# Patient Record
Sex: MALE | Race: WHITE
[De-identification: names, ages, dates, MRNs, and addresses within clinical notes are randomized per-mention and may not be internally consistent; named-entity substitution may affect disease eponyms.]

---
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
## 2022-07-13 ENCOUNTER — NON-APPOINTMENT (OUTPATIENT)
Age: 22
End: 2022-07-13

## 2022-09-28 ENCOUNTER — NON-APPOINTMENT (OUTPATIENT)
Age: 22
End: 2022-09-28

## 2023-05-15 ENCOUNTER — NON-APPOINTMENT (OUTPATIENT)
Age: 23
End: 2023-05-15

## 2023-09-04 ENCOUNTER — NON-APPOINTMENT (OUTPATIENT)
Age: 23
End: 2023-09-04

## 2024-12-13 ENCOUNTER — EMERGENCY (EMERGENCY)
Facility: HOSPITAL | Age: 24
LOS: 0 days | Discharge: HOME | End: 2024-12-13
Attending: EMERGENCY MEDICINE
Payer: COMMERCIAL

## 2024-12-13 VITALS
RESPIRATION RATE: 18 BRPM | WEIGHT: 130.07 LBS | TEMPERATURE: 98 F | HEART RATE: 79 BPM | DIASTOLIC BLOOD PRESSURE: 96 MMHG | SYSTOLIC BLOOD PRESSURE: 147 MMHG | OXYGEN SATURATION: 99 %

## 2024-12-13 PROCEDURE — 99282 EMERGENCY DEPT VISIT SF MDM: CPT

## 2024-12-13 PROCEDURE — 99283 EMERGENCY DEPT VISIT LOW MDM: CPT

## 2024-12-13 NOTE — ED PROVIDER NOTE - OBJECTIVE STATEMENT
Patient is a 24-year-old uncircumcised male with no significant past medical history presenting for medical evaluation.  Patient states he showered earlier this evening and afterwards noticed he had more foreskin over the glans of his penis than normal.  Patient states he became concerned and was researching causes online and thought he might have phimosis.  Patient denies penile pain, rashes, discharge, testicular pain, urinary symptoms, fevers, history of STIs, recent trauma, or additional concerns.

## 2024-12-13 NOTE — ED PROVIDER NOTE - PATIENT PORTAL LINK FT
You can access the FollowMyHealth Patient Portal offered by [facility] by registering at the following website: [email or web address]. By joining [facility]’s FollowMyHealth portal, you will also be able to view your health information using other applications (apps) compatible with our system.

## 2024-12-13 NOTE — ED PROVIDER NOTE - NSFOLLOWUPINSTRUCTIONS_ED_ALL_ED_FT
Foreskin Hygiene, Adult  An uncircumcised penis and a circumcised penis.  The foreskin is the loose skin that covers the head of the penis (glans). Keeping the foreskin area clean can help prevent infection and other conditions. If this area is not cleaned, a creamy substance called smegma can collect under the foreskin and cause odor and irritation.    Supplies needed:  Mild soap.  Water.  How to keep the foreskin area clean  Wash the area under the foreskin once a day in the shower or bathtub.  Gently pull back (retract) the foreskin to uncover the glans. Do not retract the foreskin farther back than is comfortable. The distance that the foreskin can retract is different for each person.  Wash the glans with mild soap and water. Rinse the area thoroughly.  Dry the glans when you get out of the shower or bathtub.  Slide the foreskin back to its regular position.  When peeing (urinating), always retract a bit of foreskin to keep the glans clean.    Contact a health care provider if:  You have trouble doing any of the cleaning steps.  You cannot pee or you have pain when you pee.  You have pain in your penis.  The skin of your penis is swollen or irritated.  Your foreskin becomes red, swollen, or painful.  Your penis develops an odor that does not go away with regular cleaning.  Get help right away if:  You cannot slide your foreskin back over the glans after you retract it.  This information is not intended to replace advice given to you by your health care provider. Make sure you discuss any questions you have with your health care provider.

## 2024-12-13 NOTE — ED ADULT TRIAGE NOTE - CHIEF COMPLAINT QUOTE
Patient presents with c/o of inability of get an erection.  Reports he is uncircumcised and was cleaning himself pulling back the skin, since this time he is unable to get erect.  Denies pain, swelling to the area.

## 2024-12-13 NOTE — ED PROVIDER NOTE - ATTENDING CONTRIBUTION TO CARE
24-year-old male with no past medical history, uncircumcised, presents as he states after retracting his foreskin earlier he noted more of a band of skin over his penis than usual, and researched online and was concerned he might have phimosis. States foreskin is fully retractile. Has no issues urinating. Denies all other symptoms including penile pain, discharge, groin rash, fever, vomiting, abdominal pain. On exam, afebrile, hemodynamically stable, saturating well on room air, NAD, well appearing, sitting comfortably in bed, no WOB, speaking full sentences, head NCAT, EOMI grossly, MMM, breathing comfortably on room air, AAO, CN's 3-12 grossly intact, ABREU spontaneously, skin warm, well perfused, no rashes or hives,  (Dr. Castelan as chaperone): Normal external genitalia, uncircumcised, no swelling or discoloration, no excess skin noted, fully retractable and reducible foreskin, nontender penile shaft. No evidence of external abnormality. No evidence of phimosis. No evidence of fracture. Urinating normally. Patient is well appearing, NAD, afebrile, hemodynamically stable. Discharged with instructions in further symptomatic care, return precautions.

## 2024-12-13 NOTE — ED ADULT NURSE NOTE - OBJECTIVE STATEMENT
Pt c/o inability to have an erection. Pt states he was cleaning his foreskin and since then he is unable to get erect. Pt is uncircumcised. Pt denies pain/swelling

## 2024-12-13 NOTE — ED ADULT NURSE NOTE - BEFAST BALANCE
No
Rivaroxaban/Xarelto - Compliance/Rivaroxaban/Xarelto - Dietary Advice/Rivaroxaban/Xarelto - Follow up monitoring/Rivaroxaban/Xarelto - Potential for adverse drug reactions and interactions

## 2024-12-13 NOTE — ED PROVIDER NOTE - PHYSICAL EXAMINATION
VITAL SIGNS: I have reviewed nursing notes and confirm.  CONSTITUTIONAL: Well-appearing, non-toxic, in NAD  SKIN: Warm dry, normal skin turgor  HEAD: NCAT  EYES: No conjunctival injection, scleral anicteric  ENT: Moist mucous membranes, normal pharynx with no erythema or exudates  NECK: Supple; full ROM. Nontender. No cervical LAD  CARD: RRR, no murmurs, rubs or gallops  RESP: Clear to ausculation bilaterally.  No rales, rhonchi, or wheezing.  ABD: Soft, non-distended, non-tender, no rebound or guarding. No CVA tenderness  : unremarkable; bilateral testicular cremasteric reflexes present; penis soft, non-tender, foreskin retractable and reducible; no lesions/rashes noted.   EXT: Full ROM

## 2025-03-04 ENCOUNTER — APPOINTMENT (OUTPATIENT)
Dept: UROLOGY | Facility: CLINIC | Age: 25
End: 2025-03-04

## 2025-03-04 VITALS
HEART RATE: 94 BPM | RESPIRATION RATE: 18 BRPM | HEIGHT: 60 IN | SYSTOLIC BLOOD PRESSURE: 138 MMHG | BODY MASS INDEX: 24.54 KG/M2 | WEIGHT: 125 LBS | DIASTOLIC BLOOD PRESSURE: 88 MMHG | OXYGEN SATURATION: 94 %

## 2025-03-04 PROCEDURE — 99203 OFFICE O/P NEW LOW 30 MIN: CPT

## 2025-03-19 ENCOUNTER — TRANSCRIPTION ENCOUNTER (OUTPATIENT)
Age: 25
End: 2025-03-19